# Patient Record
Sex: MALE | Race: WHITE
[De-identification: names, ages, dates, MRNs, and addresses within clinical notes are randomized per-mention and may not be internally consistent; named-entity substitution may affect disease eponyms.]

---

## 2018-10-03 ENCOUNTER — HOSPITAL ENCOUNTER (INPATIENT)
Dept: HOSPITAL 26 - MED | Age: 58
LOS: 3 days | Discharge: HOME | DRG: 45 | End: 2018-10-06
Attending: GENERAL PRACTICE | Admitting: GENERAL PRACTICE
Payer: MEDICAID

## 2018-10-03 VITALS — HEIGHT: 66 IN | WEIGHT: 183 LBS | BODY MASS INDEX: 29.41 KG/M2

## 2018-10-03 VITALS — DIASTOLIC BLOOD PRESSURE: 100 MMHG | SYSTOLIC BLOOD PRESSURE: 138 MMHG

## 2018-10-03 DIAGNOSIS — E87.8: ICD-10-CM

## 2018-10-03 DIAGNOSIS — E87.6: ICD-10-CM

## 2018-10-03 DIAGNOSIS — Z82.49: ICD-10-CM

## 2018-10-03 DIAGNOSIS — I10: ICD-10-CM

## 2018-10-03 DIAGNOSIS — Z71.6: ICD-10-CM

## 2018-10-03 DIAGNOSIS — Z83.3: ICD-10-CM

## 2018-10-03 DIAGNOSIS — Z79.899: ICD-10-CM

## 2018-10-03 DIAGNOSIS — I63.9: Primary | ICD-10-CM

## 2018-10-03 DIAGNOSIS — E78.5: ICD-10-CM

## 2018-10-03 DIAGNOSIS — F17.210: ICD-10-CM

## 2018-10-03 DIAGNOSIS — I16.0: ICD-10-CM

## 2018-10-03 DIAGNOSIS — Z23: ICD-10-CM

## 2018-10-03 DIAGNOSIS — G81.91: ICD-10-CM

## 2018-10-03 DIAGNOSIS — J98.11: ICD-10-CM

## 2018-10-03 DIAGNOSIS — I65.29: ICD-10-CM

## 2018-10-03 DIAGNOSIS — E83.39: ICD-10-CM

## 2018-10-03 PROCEDURE — G0482 DRUG TEST DEF 15-21 CLASSES: HCPCS

## 2018-10-03 NOTE — NUR
PT PRESENTS TO ED WITH C/O RIGHT SIDED WEAKNESS AND DIZZINESS X 2 DAYS. PT IS 
ALERT AND ORIENTED TO PERSON, PLACE, TIME, AND EVENT. PT GAIT IS EVEN AND 
STEADY. PT HAND STRENGTH IS EQUAL AND STRONG. PT DENIES AND N/V. PT PUPILS ARE 
3MM AND EQUAL AND REACTIVE TO LIGHT. NO NEURO DEFECITS NOTED. PT PLACED ON 
MONITORS AND IN BED WITH BED RAILS UP AND PENDING MD MORATAYA.

## 2018-10-04 VITALS — DIASTOLIC BLOOD PRESSURE: 77 MMHG | SYSTOLIC BLOOD PRESSURE: 138 MMHG

## 2018-10-04 VITALS — DIASTOLIC BLOOD PRESSURE: 87 MMHG | SYSTOLIC BLOOD PRESSURE: 137 MMHG

## 2018-10-04 VITALS — DIASTOLIC BLOOD PRESSURE: 87 MMHG | SYSTOLIC BLOOD PRESSURE: 156 MMHG

## 2018-10-04 VITALS — SYSTOLIC BLOOD PRESSURE: 147 MMHG | DIASTOLIC BLOOD PRESSURE: 84 MMHG

## 2018-10-04 VITALS — SYSTOLIC BLOOD PRESSURE: 141 MMHG | DIASTOLIC BLOOD PRESSURE: 85 MMHG

## 2018-10-04 VITALS — DIASTOLIC BLOOD PRESSURE: 84 MMHG | SYSTOLIC BLOOD PRESSURE: 146 MMHG

## 2018-10-04 VITALS — DIASTOLIC BLOOD PRESSURE: 79 MMHG | SYSTOLIC BLOOD PRESSURE: 138 MMHG

## 2018-10-04 LAB
ALBUMIN FLD-MCNC: 3.6 G/DL (ref 3.4–5)
AMYLASE SERPL-CCNC: 30 U/L (ref 25–115)
ANION GAP SERPL CALCULATED.3IONS-SCNC: 6.6 MMOL/L (ref 8–16)
ANION GAP SERPL CALCULATED.3IONS-SCNC: 8.3 MMOL/L (ref 8–16)
APPEARANCE UR: (no result)
AST SERPL-CCNC: 12 U/L (ref 15–37)
BARBITURATES UR QL SCN: (no result) NG/ML
BASOPHILS # BLD AUTO: 0 K/UL (ref 0–0.22)
BASOPHILS # BLD AUTO: 0.1 K/UL (ref 0–0.22)
BASOPHILS NFR BLD AUTO: 0.5 % (ref 0–2)
BASOPHILS NFR BLD AUTO: 0.9 % (ref 0–2)
BENZODIAZ UR QL SCN: (no result) NG/ML
BILIRUB SERPL-MCNC: 0.3 MG/DL (ref 0–1)
BILIRUB UR QL STRIP: NEGATIVE
BUN SERPL-MCNC: 14 MG/DL (ref 7–18)
BUN SERPL-MCNC: 15 MG/DL (ref 7–18)
BZE UR QL SCN: (no result) NG/ML
CANNABINOIDS UR QL SCN: (no result) NG/ML
CHLORIDE SERPL-SCNC: 104 MMOL/L (ref 98–107)
CHLORIDE SERPL-SCNC: 108 MMOL/L (ref 98–107)
CHOLEST/HDLC SERPL: 4 {RATIO} (ref 1–4.5)
CO2 SERPL-SCNC: 29.6 MMOL/L (ref 21–32)
CO2 SERPL-SCNC: 31 MMOL/L (ref 21–32)
COLOR UR: YELLOW
CREAT SERPL-MCNC: 1 MG/DL (ref 0.7–1.3)
CREAT SERPL-MCNC: 1 MG/DL (ref 0.7–1.3)
EOSINOPHIL # BLD AUTO: 0.1 K/UL (ref 0–0.4)
EOSINOPHIL # BLD AUTO: 0.2 K/UL (ref 0–0.4)
EOSINOPHIL NFR BLD AUTO: 1.5 % (ref 0–4)
EOSINOPHIL NFR BLD AUTO: 1.9 % (ref 0–4)
ERYTHROCYTE [DISTWIDTH] IN BLOOD BY AUTOMATED COUNT: 15.3 % (ref 11.6–13.7)
ERYTHROCYTE [DISTWIDTH] IN BLOOD BY AUTOMATED COUNT: 15.4 % (ref 11.6–13.7)
GFR SERPL CREATININE-BSD FRML MDRD: 99 ML/MIN (ref 90–?)
GFR SERPL CREATININE-BSD FRML MDRD: 99 ML/MIN (ref 90–?)
GLUCOSE SERPL-MCNC: 101 MG/DL (ref 74–106)
GLUCOSE SERPL-MCNC: 113 MG/DL (ref 74–106)
GLUCOSE UR STRIP-MCNC: NEGATIVE MG/DL
HCT VFR BLD AUTO: 41.8 % (ref 36–52)
HCT VFR BLD AUTO: 43.6 % (ref 36–52)
HDLC SERPL-MCNC: 43 MG/DL (ref 40–60)
HGB BLD-MCNC: 13.7 G/DL (ref 12–18)
HGB BLD-MCNC: 14.4 G/DL (ref 12–18)
HGB UR QL STRIP: NEGATIVE
LDH SERPL-CCNC: 169 U/L (ref 85–227)
LDLC SERPL CALC-MCNC: 104 MG/DL (ref 60–100)
LEUKOCYTE ESTERASE UR QL STRIP: NEGATIVE
LIPASE SERPL-CCNC: 185 U/L (ref 73–393)
LYMPHOCYTES # BLD AUTO: 2.1 K/UL (ref 2–11.5)
LYMPHOCYTES # BLD AUTO: 2.3 K/UL (ref 2–11.5)
LYMPHOCYTES NFR BLD AUTO: 24.6 % (ref 20.5–51.1)
LYMPHOCYTES NFR BLD AUTO: 26.9 % (ref 20.5–51.1)
MAGNESIUM SERPL-MCNC: 2.2 MG/DL (ref 1.8–2.4)
MAGNESIUM SERPL-MCNC: 2.3 MG/DL (ref 1.8–2.4)
MCH RBC QN AUTO: 28 PG (ref 27–31)
MCH RBC QN AUTO: 28 PG (ref 27–31)
MCHC RBC AUTO-ENTMCNC: 33 G/DL (ref 33–37)
MCHC RBC AUTO-ENTMCNC: 33 G/DL (ref 33–37)
MCV RBC AUTO: 84.2 FL (ref 80–94)
MCV RBC AUTO: 84.3 FL (ref 80–94)
MONOCYTES # BLD AUTO: 0.7 K/UL (ref 0.8–1)
MONOCYTES # BLD AUTO: 0.7 K/UL (ref 0.8–1)
MONOCYTES NFR BLD AUTO: 7.7 % (ref 1.7–9.3)
MONOCYTES NFR BLD AUTO: 8 % (ref 1.7–9.3)
NEUTROPHILS # BLD AUTO: 5.4 K/UL (ref 1.8–7.7)
NEUTROPHILS # BLD AUTO: 5.4 K/UL (ref 1.8–7.7)
NEUTROPHILS NFR BLD AUTO: 63.4 % (ref 42.2–75.2)
NEUTROPHILS NFR BLD AUTO: 64.6 % (ref 42.2–75.2)
NITRITE UR QL STRIP: NEGATIVE
OPIATES UR QL SCN: (no result) NG/ML
PCP UR QL SCN: (no result) NG/ML
PH UR STRIP: 7 [PH] (ref 5–9)
PHOSPHATE SERPL-MCNC: 4 MG/DL (ref 2.5–4.9)
PHOSPHATE SERPL-MCNC: 5 MG/DL (ref 2.5–4.9)
PLATELET # BLD AUTO: 287 K/UL (ref 140–450)
PLATELET # BLD AUTO: 309 K/UL (ref 140–450)
POTASSIUM SERPL-SCNC: 3.2 MMOL/L (ref 3.5–5.1)
POTASSIUM SERPL-SCNC: 3.3 MMOL/L (ref 3.5–5.1)
PROTHROMBIN TIME: 9.5 SECS (ref 10.8–13.4)
RBC # BLD AUTO: 4.96 MIL/UL (ref 4.2–6.1)
RBC # BLD AUTO: 5.18 MIL/UL (ref 4.2–6.1)
SODIUM SERPL-SCNC: 140 MMOL/L (ref 136–145)
SODIUM SERPL-SCNC: 141 MMOL/L (ref 136–145)
TRIGL SERPL-MCNC: 117 MG/DL (ref 30–150)
TSH SERPL DL<=0.05 MIU/L-ACNC: 1.23 UIU/ML (ref 0.34–3.74)
WBC # BLD AUTO: 8.4 K/UL (ref 4.8–10.8)
WBC # BLD AUTO: 8.5 K/UL (ref 4.8–10.8)

## 2018-10-04 PROCEDURE — 3E0234Z INTRODUCTION OF SERUM, TOXOID AND VACCINE INTO MUSCLE, PERCUTANEOUS APPROACH: ICD-10-PCS | Performed by: FAMILY MEDICINE

## 2018-10-04 RX ADMIN — DEXTROSE AND SODIUM CHLORIDE SCH MLS/HR: 5; .45 INJECTION, SOLUTION INTRAVENOUS at 11:09

## 2018-10-04 RX ADMIN — POTASSIUM CHLORIDE SCH MEQ: 750 TABLET, FILM COATED, EXTENDED RELEASE ORAL at 12:26

## 2018-10-04 RX ADMIN — DEXTROSE AND SODIUM CHLORIDE SCH MLS/HR: 5; .45 INJECTION, SOLUTION INTRAVENOUS at 03:00

## 2018-10-04 RX ADMIN — POTASSIUM CHLORIDE SCH MEQ: 750 TABLET, FILM COATED, EXTENDED RELEASE ORAL at 21:27

## 2018-10-04 RX ADMIN — ATORVASTATIN CALCIUM SCH MG: 80 TABLET, FILM COATED ORAL at 02:58

## 2018-10-04 RX ADMIN — ATORVASTATIN CALCIUM SCH MG: 80 TABLET, FILM COATED ORAL at 17:14

## 2018-10-04 NOTE — NUR
RECEIVED REPORT AT BEDSIDE FROM NIGHT SHIFT NURSE. SKIN INTACT. NO S/S OF RESPIRATORY 
DISTRESS, RESPIRATIONS EVEN AND UNLABORED. NO COMPLAINTS OF PAIN OR DISCOMFORT. LUNGS CTA. 
HOB AT 30 DEGREES. HEART RHYTHM IS REGULAR. IV SITE PATENT AND ASYMPTOMATIC, RUNNING IVF PER 
MD ORDERS. PT IS AMBULATORY. ON SEIZURE PRECAUTIONS. BED IN LOWEST POSITION, BED BREAKS ON, 
BOTH SIDE RAILS UP. BEDSIDE TABLE AND CALL LIGHT ARE WITHIN REACH. WILL CONTINUE TO MONITOR. 

-------------------------------------------------------------------------------

Addendum: 10/04/18 at 1107 by Yana Arceo RN

-------------------------------------------------------------------------------

HX CVA WITH RIGHT SIDED WEAKNESS.

## 2018-10-04 NOTE — NUR
ORTHOSTATIC VITAL SIGNS DONE. VITAL SIGNS WITHIN NORMAL LIMITS. PT STABLE, NO SIGNS OF 
DISTRESS NOTED AT THIS TIME. BED IN LOWEST POSITION, BED ALARM ON.

## 2018-10-04 NOTE — NUR
HELPED PT TO THE BATHROOM. GAIT EVEN AND STEADY, WITHOUT ASSIST. ALL SAFETY PRECAUTIONS IN 
PLACE. PT URINATED IN BATHROOM AND WENT BACK TO BED. WILL CONTINUE TO MONITOR.

## 2018-10-04 NOTE — NUR
Patient will be admitted to care of DR MIN. Admited to TELE.  Will go to room 
123-B. Belongings list completed.  Report to SAL[facility]ADOR DELATORRE.

## 2018-10-04 NOTE — NUR
PATIENT HAS BEEN SCREENED AND CATEGORIZED AS MODERATE NUTRITION RISK. PATIENT WILL BE SEEN 
WITHIN 3-5 DAYS OF ADMISSION.



10/06/18  10/08/18



TIAN OLIVIER RD

## 2018-10-04 NOTE — NUR
DR REYNAGA AT BEDSIDE TO EXPLAIN CT RESULTS TO PATIENT. PIV STARTED, PT 
TOLERATED WELL. ANSWERING QUESTIONS APPROPRIATELY.

## 2018-10-04 NOTE — NUR
RECEIVED PT  FROM YANNI RN PT AAOX4  RESTING ON BD  ON TELEMETRY SR, HL ON LEFT AC AND RT 
HAND PATENT NOT DISTRESS NOTED DENIES ANY SIDE WEAKNESS INITIAL ASSESSMENT DONE

## 2018-10-05 VITALS — DIASTOLIC BLOOD PRESSURE: 90 MMHG | SYSTOLIC BLOOD PRESSURE: 148 MMHG

## 2018-10-05 VITALS — SYSTOLIC BLOOD PRESSURE: 149 MMHG | DIASTOLIC BLOOD PRESSURE: 90 MMHG

## 2018-10-05 VITALS — SYSTOLIC BLOOD PRESSURE: 140 MMHG | DIASTOLIC BLOOD PRESSURE: 83 MMHG

## 2018-10-05 VITALS — SYSTOLIC BLOOD PRESSURE: 144 MMHG | DIASTOLIC BLOOD PRESSURE: 92 MMHG

## 2018-10-05 VITALS — SYSTOLIC BLOOD PRESSURE: 152 MMHG | DIASTOLIC BLOOD PRESSURE: 75 MMHG

## 2018-10-05 VITALS — DIASTOLIC BLOOD PRESSURE: 86 MMHG | SYSTOLIC BLOOD PRESSURE: 154 MMHG

## 2018-10-05 LAB
ANION GAP SERPL CALCULATED.3IONS-SCNC: 6 MMOL/L (ref 8–16)
BASOPHILS # BLD AUTO: 0 K/UL (ref 0–0.22)
BASOPHILS NFR BLD AUTO: 0.6 % (ref 0–2)
BUN SERPL-MCNC: 14 MG/DL (ref 7–18)
CHLORIDE SERPL-SCNC: 108 MMOL/L (ref 98–107)
CO2 SERPL-SCNC: 26.8 MMOL/L (ref 21–32)
CREAT SERPL-MCNC: 0.9 MG/DL (ref 0.7–1.3)
EOSINOPHIL # BLD AUTO: 0.2 K/UL (ref 0–0.4)
EOSINOPHIL NFR BLD AUTO: 2.6 % (ref 0–4)
ERYTHROCYTE [DISTWIDTH] IN BLOOD BY AUTOMATED COUNT: 14.9 % (ref 11.6–13.7)
GFR SERPL CREATININE-BSD FRML MDRD: 112 ML/MIN (ref 90–?)
GLUCOSE SERPL-MCNC: 114 MG/DL (ref 74–106)
HCT VFR BLD AUTO: 43.6 % (ref 36–52)
HGB BLD-MCNC: 14.4 G/DL (ref 12–18)
LYMPHOCYTES # BLD AUTO: 2.1 K/UL (ref 2–11.5)
LYMPHOCYTES NFR BLD AUTO: 25.6 % (ref 20.5–51.1)
MAGNESIUM SERPL-MCNC: 2.1 MG/DL (ref 1.8–2.4)
MCH RBC QN AUTO: 28 PG (ref 27–31)
MCHC RBC AUTO-ENTMCNC: 33 G/DL (ref 33–37)
MCV RBC AUTO: 83.9 FL (ref 80–94)
MONOCYTES # BLD AUTO: 0.6 K/UL (ref 0.8–1)
MONOCYTES NFR BLD AUTO: 7.3 % (ref 1.7–9.3)
NEUTROPHILS # BLD AUTO: 5.2 K/UL (ref 1.8–7.7)
NEUTROPHILS NFR BLD AUTO: 63.9 % (ref 42.2–75.2)
PHOSPHATE SERPL-MCNC: 3.3 MG/DL (ref 2.5–4.9)
PLATELET # BLD AUTO: 286 K/UL (ref 140–450)
POTASSIUM SERPL-SCNC: 3.8 MMOL/L (ref 3.5–5.1)
RBC # BLD AUTO: 5.2 MIL/UL (ref 4.2–6.1)
SODIUM SERPL-SCNC: 137 MMOL/L (ref 136–145)
T4 SERPL-MCNC: 6 UG/DL (ref 4.5–12)
WBC # BLD AUTO: 8.1 K/UL (ref 4.8–10.8)

## 2018-10-05 RX ADMIN — DEXTROSE AND SODIUM CHLORIDE SCH MLS/HR: 5; .45 INJECTION, SOLUTION INTRAVENOUS at 17:40

## 2018-10-05 RX ADMIN — ATORVASTATIN CALCIUM SCH MG: 80 TABLET, FILM COATED ORAL at 17:43

## 2018-10-05 NOTE — NUR
RECEIVED PATIENT AWAKE SITTING ON THE SIDE OF THE BED. PATIENT AA0X4, AMBULATORY. DISCUSSED 
PLAN OF CARE. FALL PRECAUTION APPLIED. CALL LIGHT WITHIN REACH.

## 2018-10-05 NOTE — NUR
D5-1/2NS NOT ADMINISTERED PER PARAMETERS. PT IS NOT NPO. HAS BEEN EATING ALL MEALS. NO S/S 
OF HYPOGLYCEMIA.

## 2018-10-05 NOTE — NUR
PATIENT HAS SIGNED CONSENT FOR CONTRAST FOR CT HEAD. PT HAS ATE LUNCH ALREADY. WILL REMOVE 
ALL FOOD AND DRINK FROM BEDSIDE.

-------------------------------------------------------------------------------

Addendum: 10/05/18 at 1407 by Yana Arceo RN

-------------------------------------------------------------------------------

CONSENT FILED IN PT CHART.

## 2018-10-05 NOTE — NUR
SEE PATIENT ASLEEP ON BED IN COMFORTABLE POSITION . BED IN LOW LOCKED POSITION. CALL LIGHT 
WITHIN REACH. NO S/S OF DISTRESS NOTED. WILL CONTINUE TO MONITOR.

## 2018-10-05 NOTE — NUR
RECEIVED REPORT FROM NIGHT SHIFT RN ANDREAS AT BEDSIDE. PT IN STABLE CONDITION. DENIES 
DIZZINESS, PAIN, DISCOMFORT, AND RIGHT SIDED WEAKNESS. STRENGTH IN BILATERAL HANDS EQUAL. 
RIGHT LEG SLIGHTLY WEAK AGAINST RESISTANCE. GAIT EVEN AND STEADY, WITHOUT ASSIST. IV SITE 
PATENT AND ASYMPTOMATIC, ON SL. SKIN INTACT. TATTOOS OVER BODY. DISCUSSED PLAN OF CARE WITH 
PATIENT. PT VERBALIZED COMPLETE UNDERSTANDING. ALL SAFETY PRECAUTIONS IN PLACE, WILL 
CONTINUE TO MONITOR.

## 2018-10-05 NOTE — NUR
PT TAKEN BY [employer] VIA WHEELCHAIR FOR CT HEAD WITH CONTRAST. VITALS STABLE. LEFT AC 18G IV 
FLUSHING WELL.

## 2018-10-06 VITALS — DIASTOLIC BLOOD PRESSURE: 86 MMHG | SYSTOLIC BLOOD PRESSURE: 152 MMHG

## 2018-10-06 VITALS — DIASTOLIC BLOOD PRESSURE: 76 MMHG | SYSTOLIC BLOOD PRESSURE: 129 MMHG

## 2018-10-06 VITALS — DIASTOLIC BLOOD PRESSURE: 87 MMHG | SYSTOLIC BLOOD PRESSURE: 156 MMHG

## 2018-10-06 VITALS — SYSTOLIC BLOOD PRESSURE: 161 MMHG | DIASTOLIC BLOOD PRESSURE: 96 MMHG

## 2018-10-06 NOTE — NUR
SEEN PATIENT ASLEEP BUT EASILY AROUSABLE. V/S TAKEN AND RECORDED. ALL NEEDS ATTENDED. NO S/S 
OF DISTRESS NOTED AT THIS TIME. WILL CONTINUE TO MONITOR.

## 2018-10-06 NOTE — NUR
PT DISCHARGED TO HOME. EXPLAINED DISCHARGE INSTRUCTION AS ORDERED BY MD. INFORMED HIM TO 
CALL THE [facility] BEFORE VISIT , VISIT TO BE DONE 10/8-10/12 IN BETWEEN [phone]. 
OFFICE ADDRESS AND PHON NUMBER PROVIDED WITH DC PAPER. PRESCRIPTION GIVEN TO PT. PT EDUCATED 
ON FOLLOWING CARDIAC DIET FOR PROLONGED BENEFIT, AND REGULAR EXERCISE. PT ALSO ADVISED TO 
REFRAIN FROM SMOKING AND ALCOHOL. VERBALIZED UNDERSTANDING. PT STABLE , ALERT AND 
COMMUNICATING WELL AT TIME OF DISCHARGE. WENT HOME WITH ALL HIS BELONGINGS, WALKED TO FRONT 
BY SELF, FAMILY MEMBER AT BEDSIDE.

## 2018-10-06 NOTE — NUR
ADMINISTERED MEDS TO PT AS ORDERED TOLERATED WELL. INFORMED HIM WAITING FOR HIS DC ORDER. 
NEEDS BP IN NORMAL RANGE. VERBALIZED UNDERSTANDING. WILL CONTINUE TO MONITOR PT.

## 2018-10-06 NOTE — NUR
RECEIVED REPORT FROM NURSE AT BEDSIDE. PT LYING ON HIS BED. PT AOX4, ABLE TO COMMUNICATE 
EASILY. PT HAS IV ACCESS ON LFT FA, 22 G, SALINE LOCK. NO IVF INFUSING. SKI IS INTACT. PT 
STATES TO GO HOME TODAY. INFORMED HIM THAT MD WILL SEE PT AND THEN IF STABLE , WILL PUT 
ORDER FOR DISCHARGE. UPDATED BOARD AND INTRODUCED SELF. DENIES ANY PAIN. NO SIGN OF DISTRESS 
NOTED. PLACED CALL LIGHT WITHIN PT REACH. INFORMED HIM TO USE CALL LIGHT FOR ANY HELP. 
VERBALISED UNDERSTANDING. WILL CONTINUE TO MONITOR PT.

## 2018-10-06 NOTE — NUR
MD CALLED AND ORDERED TO ADMINISTER METOPROL TO PT. STATES CAN ADMINISTER OTHER BP MEDS AT 
0900 AM. ADMINISTERED MEDS TO PT. TOLERATED WELL. INFORMED HIM THAT WILL COME BACK OTHER 
MEDS AT 0900 AND THEN AFTER HIS BP GETS IN NORMAL RANGE WILL WORK ON HIS DC PAPER AS ORDERED 
BY MD. VERBALIZED UNDERSTANDING. WILL CONTINUE TO MONITOR PT.

## 2018-12-11 ENCOUNTER — HOSPITAL ENCOUNTER (EMERGENCY)
Dept: HOSPITAL 26 - MED | Age: 58
Discharge: HOME | End: 2018-12-11
Payer: SELF-PAY

## 2018-12-11 VITALS — DIASTOLIC BLOOD PRESSURE: 78 MMHG | SYSTOLIC BLOOD PRESSURE: 148 MMHG

## 2018-12-11 VITALS — HEIGHT: 66 IN | BODY MASS INDEX: 28.12 KG/M2 | WEIGHT: 175 LBS

## 2018-12-11 VITALS — DIASTOLIC BLOOD PRESSURE: 74 MMHG | SYSTOLIC BLOOD PRESSURE: 130 MMHG

## 2018-12-11 DIAGNOSIS — F17.200: ICD-10-CM

## 2018-12-11 DIAGNOSIS — N45.1: Primary | ICD-10-CM

## 2018-12-11 DIAGNOSIS — Z79.899: ICD-10-CM

## 2018-12-11 DIAGNOSIS — Z86.73: ICD-10-CM

## 2018-12-11 DIAGNOSIS — I10: ICD-10-CM

## 2018-12-11 DIAGNOSIS — Z79.82: ICD-10-CM

## 2018-12-11 LAB
APPEARANCE UR: (no result)
BILIRUB UR QL STRIP: NEGATIVE
COLOR UR: YELLOW
GLUCOSE UR STRIP-MCNC: NEGATIVE MG/DL
HGB UR QL STRIP: (no result)
LEUKOCYTE ESTERASE UR QL STRIP: (no result)
NITRITE UR QL STRIP: NEGATIVE
PH UR STRIP: 6 [PH] (ref 5–9)
RBC #/AREA URNS HPF: (no result) /HPF (ref 0–5)
WBC,URINE: (no result) /HPF (ref 0–5)

## 2018-12-11 PROCEDURE — 99284 EMERGENCY DEPT VISIT MOD MDM: CPT

## 2018-12-11 PROCEDURE — 87086 URINE CULTURE/COLONY COUNT: CPT

## 2018-12-11 PROCEDURE — 96372 THER/PROPH/DIAG INJ SC/IM: CPT

## 2018-12-11 PROCEDURE — 76870 US EXAM SCROTUM: CPT

## 2018-12-11 PROCEDURE — 36415 COLL VENOUS BLD VENIPUNCTURE: CPT

## 2018-12-11 PROCEDURE — 81001 URINALYSIS AUTO W/SCOPE: CPT

## 2018-12-11 NOTE — NUR
ASSUMED CARE OF PT AT THIS TIME. PT AWAITS U/S RESULTS AND MD DISPOSITION. NAD. 
VSS. WILL CONTINUE TO MONITOR.

## 2018-12-11 NOTE — NUR
Patient discharged with v/s stable. Written and verbal after care instructions 
given and explained. 

Patient alert, oriented and verbalized understanding of instructions. 
Ambulatory with steady gait. All questions addressed prior to discharge. ID 
band removed. Patient advised to follow up with PMD. Rx of MOTRIN AND CIPRO 
given. Patient educated on indication of medication including possible reaction 
and side effects. Opportunity to ask questions provided and answered.

## 2018-12-11 NOTE — NUR
58/ M BIB SELF, C/O LEFT TESTICULAR+ PAIN, SWOLLEN AND TENDERNESS. DENIES PAIN 
WITH URINATION, NO DISCOLORATION ADDS HE WAS LIFTING HEAVY AT WORK 7 DAYS AGO, 
FELT PAIN AFTERWARDS. 



HX: HTN, 

RX: ASPIRIN, ATOROVASTATIN, METOPROLOL, AMLODIPINE

## 2024-05-14 NOTE — NUR
RECEIVED REPORT FROM DAY SHIFT RN, FOR CONTINUITY OF CARE. PT IS A/OX4 WITH CLEAR SPEECH, ON 
ROOM AIR. PT IS ABLE TO MAKE NEEDS KNOWN, ABLE TO FOLLOW COMMANDS. PT BREATHS EQUAL AND 
UNLABORED. PUPILS EQUAL AND REACTIVE TO LIGHT, STRENGTH EQUAL TO ALL EXTREMITIES. BOWEL 
SOUNDS PRESENT. PT SKIN IS INTACT. PT AMBULATES WITH STEADY GAIT, BUT IS A FALL RISK FOR 
RIGHT SIDED WEAKNESS. PT HAS A 18G IV TO RIGHT HAND AND A 18G IV TO LEFT AC, (LEFT IV) 
INFUSING NS AT 100ML/HR, BOTH ASYMPTOMATIC AND INTACT. DISCUSSED PLAN OF CARE WITH PT, PT 
VERBALIZED UNDERSTANDING. VITAL SIGNS WITHIN NORMAL LIMITS. PT STABLE, NO SIGNS OF DISTRESS 
NOTED AT THIS TIME. BED IN LOWEST POSITION, BED ALARM ON. CALL LIGHT WITHIN REACH, WILL 
CONTINUE TO MONITOR. No No No No No No No No No No No No No No No No No No